# Patient Record
Sex: FEMALE | Race: WHITE
[De-identification: names, ages, dates, MRNs, and addresses within clinical notes are randomized per-mention and may not be internally consistent; named-entity substitution may affect disease eponyms.]

---

## 2018-04-07 ENCOUNTER — HOSPITAL ENCOUNTER (EMERGENCY)
Dept: HOSPITAL 80 - FED | Age: 38
Discharge: HOME | End: 2018-04-07
Payer: OTHER GOVERNMENT

## 2018-04-07 VITALS — DIASTOLIC BLOOD PRESSURE: 99 MMHG | SYSTOLIC BLOOD PRESSURE: 148 MMHG

## 2018-04-07 DIAGNOSIS — M54.12: Primary | ICD-10-CM

## 2018-04-07 LAB — PLATELET # BLD: 369 10^3/UL (ref 150–400)

## 2018-04-07 NOTE — EDPHY
H & P


Time Seen by Provider: 04/07/18 14:58


HPI/ROS: 





CHIEF COMPLAINT:  Severe right arm pain, vomiting, dizziness





HISTORY OF PRESENT ILLNESS:  37-year-old female presents with severe right arm 

pain.  Onset sharp and stabbing severe right upper extremity pain 2 nights ago 

while in bed.  The pain was located in the right shoulder and radiated down to 

the hand.  The pain was sharp and stabbing and severe.  Associated with nausea 

and vomiting.  She got out of bed, felt very dizzy and like she might faint.  

Eventually she was able to go back to sleep, and when she awoke the pain was 

mild.  She had 1 episode of vomiting yesterday morning.  She felt fairly well 

yesterday and did her usual activities.  However, when she went back to bed 

last night, the pain started to recur and was associated with nausea.  She got 

out of bed and walked around the house for a couple hours after taking aspirin.

  She was finally able to get back to sleep and slept well.  Today she is 

asymptomatic, except for a slight left-sided headache, which is not atypical 

for her.  No chest pain, shortness of breath, numbness or weakness.  No recent 

head or neck trauma.  No chiropractic manipulation.  No prior similar symptoms.





REVIEW OF SYSTEMS:


Constitutional:  No fever, no chills


Eyes:  No visual changes


ENT:  No sore throat


Respiratory:  No cough, no shortness of breath


Cardiac:  No chest pain


Gastrointestinal:  no abdominal pain


Genitourinary:  Left menstrual period 2 weeks ago, no dysuria


Skin:  No rash


Neurological:  No headache, no numbness, no weakness


Psychiatric:  No depression





Past Medical/Surgical History: 





Denies





Social History: 











Smoking Status: Never smoked


Physical Exam: 





General Appearance:  Alert, pleasant


Eyes:  Pupils equal and round, no conjunctival pallor or injection


ENT, Mouth:  Mucous membranes moist


Neck:  Normal inspection, no midline or paraspinous tenderness, range of motion 

without pain


Respiratory:  Normal inspection, no tenderness, Lungs are clear to auscultation


Cardiovascular:  Regular rate and rhythm, no murmur


Gastrointestinal:  Abdomen is soft and nontender


Back:  Normal inspection, no tenderness


Neurological:  Alert, oriented x3, cranial nerves II through XII intact, motor 5

/5, sensory intact to light touch, biceps DTRs 2+ bilaterally, normal gait.


Skin:  Warm and dry, no rash


Extremities:  Right upper extremity-normal inspection, no swelling or tenderness

, range of motion without pain


Vascular:  2+ radial pulses, capillary refill brisk


Psychiatric:  Mood and affect normal





Constitutional: 


 Initial Vital Signs











Temperature (C)  36.7 C   04/07/18 14:06


 


Heart Rate  75   04/07/18 14:06


 


Respiratory Rate  18   04/07/18 14:06


 


Blood Pressure  139/91 H  04/07/18 14:06


 


O2 Sat (%)  99   04/07/18 14:06








 











O2 Delivery Mode               Room Air














Allergies/Adverse Reactions: 


 





No Known Allergies Allergy (Unverified 04/07/18 14:05)


 








Home Medications: 














 Medication  Instructions  Recorded


 


Ondansetron Odt [Zofran Odt 4 mg 4 mg PO Q4 PRN #6 tab 04/07/18





(*)]  














Medical Decision Making





- Diagnostics


EKG Interpretation: 





EKG from urgent care reviewed by me and is normal.  Normal sinus rhythm, normal 

intervals, no ST or T segment changes.


Imaging Results: 


Neck CTA  04/07/18 15:25


Impression: Normal CT angiography of the carotid and vertebral basilar systems..


 


Note: All stenoses are calculated using NASCET Criteria.


 


Results called to Dr. Shah at 5:15 PM.


 


ED Course/Re-evaluation: 





This patient presents with severe right upper extremity pain with associated 

dizziness and vomiting.  PE normal now, unable to elicit pain.  Clinical 

scenario most consistent with cervical radiculopathy, though concern for 

dissection, given multiple symptoms including dizziness, vomiting.  For this 

reason CTA of the neck ordered.  Other etiologies considered, including cardiac

, other vascular, infectious.    





17 20-CTA is unremarkable, read by Dr. Franco Flores.  Labs unremarkable.  

Results discussed with the patient.  I will treat the patient for cervical 

radiculopathy.  Follow-up with PCP in 2-3 days.  Return to the emergency 

department promptly for worsening symptoms or any concerns.


Differential Diagnosis: 





includes though not limited to, and in no particular order, arterial dissection

, cervical radiculopathy, tumor, DVT, infection, ACS








- Data Points


Laboratory Results: 


 Laboratory Results





 04/07/18 16:12 





 04/07/18 16:12 








Medications Given: 


 








Discontinued Medications





Ketorolac Tromethamine (Toradol)  15 mg IVP EDNOW ONE


   Stop: 04/07/18 15:36


   Last Admin: 04/07/18 17:04 Dose:  15 mg








Departure





- Departure


Disposition: Home, Routine, Self-Care


Clinical Impression: 


 Cervical radiculopathy





Condition: Good


Instructions:  Ondansetron (By mouth), Cervical Radiculopathy (ED)


Additional Instructions: 


Ibuprofen 600 mg 3 times daily while the pain persists.





Referrals: 


Josee Mario MD [[facility] Primary Care Provider] - As per Instructions


Prescriptions: 


Ondansetron Odt [Zofran Odt 4 mg (*)] 4 mg PO Q4 PRN #6 tab


 PRN Reason: Nausea